# Patient Record
Sex: MALE | Race: WHITE | NOT HISPANIC OR LATINO
[De-identification: names, ages, dates, MRNs, and addresses within clinical notes are randomized per-mention and may not be internally consistent; named-entity substitution may affect disease eponyms.]

---

## 2022-05-18 PROBLEM — Z00.00 ENCOUNTER FOR PREVENTIVE HEALTH EXAMINATION: Status: ACTIVE | Noted: 2022-05-18

## 2022-05-23 ENCOUNTER — APPOINTMENT (OUTPATIENT)
Dept: NEUROSURGERY | Facility: CLINIC | Age: 57
End: 2022-05-23

## 2022-06-08 ENCOUNTER — NON-APPOINTMENT (OUTPATIENT)
Age: 57
End: 2022-06-08

## 2022-06-08 ENCOUNTER — APPOINTMENT (OUTPATIENT)
Dept: NEUROSURGERY | Facility: CLINIC | Age: 57
End: 2022-06-08
Payer: COMMERCIAL

## 2022-06-08 VITALS
WEIGHT: 175 LBS | TEMPERATURE: 98.7 F | HEIGHT: 67 IN | BODY MASS INDEX: 27.47 KG/M2 | SYSTOLIC BLOOD PRESSURE: 115 MMHG | HEART RATE: 137 BPM | OXYGEN SATURATION: 99 % | DIASTOLIC BLOOD PRESSURE: 74 MMHG

## 2022-06-08 DIAGNOSIS — Z01.818 ENCOUNTER FOR OTHER PREPROCEDURAL EXAMINATION: ICD-10-CM

## 2022-06-08 PROCEDURE — 99205 OFFICE O/P NEW HI 60 MIN: CPT

## 2022-06-09 NOTE — ASSESSMENT
[FreeTextEntry1] : I have discussed the natural history and treatment options for cervical myelopathy with the patient. I explained the indications for observation, conservative management, medical management, physical therapy, pain management approaches and surgery. I explained the different types and surgical approaches including anterior and posterior approaches as well as decompression only procedures and instrumented fusions. I discussed the risks, benefits, possible complications and expected outcome related to each treatment option. The risks of surgery were discussed in detail including but not limited to postoperative infection at the surgical site, hospital acquired pneumonia, hospital acquired urinary tract infection, postoperative meningitis, wound dehiscence, CSF leak, stroke (ischemic and hemorrhagic), postoperative seizures, worsening motor function due to spinal cord or nerve injury, postoperative visual deficit which could be permanent (blindness) when surgery is performed in a prone position, cardiovascular complications (MI, PE, DVT) and I also explained that some of these complications could lead to sepsis, coma or even death. I discussed the fact that some of these complications may require subsequent surgical procedure(s) to correct them. \par In the end, I recommend completing the work up with F/E xrays and CT scan cervical spine and follow up in 2 weeks so we can discuss the results and surgical options.\par The patient understands the plan of care and is in agreement.  All questions answered to patient satisfaction.

## 2022-06-09 NOTE — END OF VISIT
[FreeTextEntry3] : I have seen the patient and reviewed the case together with PA and I agree with the final recommendations and plan of care.\par \par Perry Montes MD\par Neurosurgery\par \par  [Time Spent: ___ minutes] : I have spent [unfilled] minutes of time on the encounter. [>50% of the face to face encounter time was spent on counseling and/or coordination of care for ___] : Greater than 50% of the face to face encounter time was spent on counseling and/or coordination of care for [unfilled]

## 2022-06-09 NOTE — HISTORY OF PRESENT ILLNESS
[de-identified] : ROYER FLORES is a 56 year male with a PMH of  HTN, pseudogout/arthritis/ACL tear of left knee, who presents to the office today at the request of his neurologist Dr. Paulina Tabares for neurosurgical consultation due to cervical myelopathy. Symptoms of numbness and tingling started 6 years ago, initially right arm and hand, then right leg and foot, then left side arm and legs. Had similar symptoms 15 years ago treated with cervical traction, PT and resolved. He now also has weakness of the lower extremities resulting in significant difficulty with ambulation. He can walk very short distances with a walker. He states he is house bound. He had to retire early d/t these symptoms in December 2020. He is also having difficulty with writing, typing, holding items. These symptoms are severely affecting his quality of life. The patient has undergone imaging in the form of MRI cervical spine at [facility] on 4/7/22 which revealed cervical spondylosis notable for concentric canal narrowing at C6-7 and C7-T1 (see detailed report scanned in EMR).\par PMH: per HPI\par PSH:left knee arthroscopy age 25, tonsillectomy as age 21, wisdom teeth extraction age 22\par FamHx: mother, father, brother HTN \par Social Hx: retired school psychologist, single, lives alone, non-smoker, occasional Etoh\par Allergies: codeine, opioids - vomiting \par Medications: amlodipine, Bystolic, valsartan \par  \par PCP Dr. Hart\par \par

## 2022-06-10 ENCOUNTER — RESULT REVIEW (OUTPATIENT)
Age: 57
End: 2022-06-10

## 2022-08-08 ENCOUNTER — APPOINTMENT (OUTPATIENT)
Dept: NEUROSURGERY | Facility: CLINIC | Age: 57
End: 2022-08-08

## 2022-08-08 VITALS
WEIGHT: 132 LBS | BODY MASS INDEX: 20.72 KG/M2 | OXYGEN SATURATION: 97 % | HEIGHT: 67 IN | SYSTOLIC BLOOD PRESSURE: 143 MMHG | HEART RATE: 132 BPM | DIASTOLIC BLOOD PRESSURE: 100 MMHG | TEMPERATURE: 98.7 F

## 2022-08-08 PROCEDURE — 99215 OFFICE O/P EST HI 40 MIN: CPT

## 2022-08-09 NOTE — ASSESSMENT
[FreeTextEntry1] : I have discussed the natural history and treatment options for cervical myelopathy with the patient. I explained the indications for observation, conservative management, medical management, physical therapy, pain management approaches and surgery. I explained the different types and surgical approaches including anterior and posterior approaches as well as decompression only procedures and instrumented fusions. I discussed the risks, benefits, possible complications and expected outcome related to each treatment option. The risks of surgery were discussed in detail including but not limited to postoperative infection at the surgical site, hospital acquired pneumonia, hospital acquired urinary tract infection, postoperative meningitis, wound dehiscence, CSF leak, stroke (ischemic and hemorrhagic), postoperative seizures, worsening motor function due to spinal cord or nerve injury, postoperative visual deficit which could be permanent (blindness) when surgery is performed in a prone position, cardiovascular complications (MI, PE, DVT) and I also explained that some of these complications could lead to sepsis, coma or even death. I discussed the fact that some of these complications may require subsequent surgical procedure(s) to correct them. \par In the end, I recommend surgical decompression with two level anterior cervical discectomy and fusion at C6-C7 and C7-T1. Chlorhexidine wipes and pre-surgical testing orders were given to him. He will get pre-op clearance with his PCP and his cardiologist. \par The patient understands the plan of care and is in agreement.  All questions answered to patient satisfaction.

## 2022-08-09 NOTE — HISTORY OF PRESENT ILLNESS
[FreeTextEntry1] : Mr. Chance returns to the office today for interval follow up and imaging review for cervical myelopathy. As recommended he has undergone CT and flex/ex X-rays of the cervical spine at [facility] on 7/1/22 and brought in CD for review (reports not available). No interval changes to medical history. His symptoms have worsened. He is now unable to ambulate and confined to wheelchair. He has also developed urinary retention and incontinence and severe constipation. \par \par 6/8/22: ROYER CHANCE is a 56 year male with a PMH of HTN, pseudogout/arthritis/ACL tear of left knee, who presents to the office today at the request of his neurologist Dr. Paulina Tabares for neurosurgical consultation due to cervical myelopathy. Symptoms of numbness and tingling started 6 years ago, initially right arm and hand, then right leg and foot, then left side arm and legs. Had similar symptoms 15 years ago treated with cervical traction, PT and resolved. He now also has weakness of the lower extremities resulting in significant difficulty with ambulation. He can walk very short distances with a walker. He states he is house bound. He had to retire early d/t these symptoms in December 2020. He is also having difficulty with writing, typing, holding items. These symptoms are severely affecting his quality of life. The patient has undergone imaging in the form of MRI cervical spine at [facility] on 4/7/22 which revealed cervical spondylosis notable for concentric canal narrowing at C6-7 and C7-T1 (see detailed report scanned in EMR).\par PMH: per HPI\par PSH:left knee arthroscopy age 25, tonsillectomy as age 21, wisdom teeth extraction age 22\par FamHx: mother, father, brother HTN \par Social Hx: retired school psychologist, single, lives alone, non-smoker, occasional Etoh\par Allergies: codeine, opioids - vomiting \par Medications: amlodipine, Bystolic, valsartan \par  \par PCP Dr. Hart\par

## 2022-08-09 NOTE — PHYSICAL EXAM
[FreeTextEntry1] : Constitutional: alert and in no acute distress. \par Psychiatric: oriented to person, place, and time, insight and judgment were intact and the affect was normal. \par \par Orientation: oriented to person, oriented to place and oriented to time. \par Memory: short term memory intact and remote memory intact. \par Attention: the attention span was normal and normal concentrating ability. \par Language: fluency intact and comprehension intact. \par Fund of knowledge: displays adequate knowledge of current events, adequate knowledge of personal past history and adequate range of vocabulary. \par Cranial Nerves: visual acuity intact bilaterally, pupils equal round and reactive to light, extraocular motion intact, facial sensation intact symmetrically, face symmetrical, hearing was intact bilaterally, tongue and palate midline, head turning and shoulder shrug symmetric and there was no tongue deviation with protrusion. \par Sensory exam: light touch was intact. \par non-ambulatory\par Deep tendon reflexes: \par Biceps right 3+. Biceps left 3+.  \par Triceps right 3+. Triceps left 3+.  \par Brachioradialis right 3+. Brachioradialis left 3+.  \par Patella right 3+. Patella left 3+.  \par Ankle jerk right 3+. Ankle jerk left 3+.  \par

## 2022-09-01 ENCOUNTER — APPOINTMENT (OUTPATIENT)
Dept: NEUROSURGERY | Facility: HOSPITAL | Age: 57
End: 2022-09-01

## 2022-09-28 ENCOUNTER — APPOINTMENT (OUTPATIENT)
Dept: NEUROSURGERY | Facility: CLINIC | Age: 57
End: 2022-09-28

## 2022-09-28 ENCOUNTER — NON-APPOINTMENT (OUTPATIENT)
Age: 57
End: 2022-09-28

## 2022-09-28 VITALS
WEIGHT: 175 LBS | DIASTOLIC BLOOD PRESSURE: 83 MMHG | OXYGEN SATURATION: 98 % | HEIGHT: 67 IN | SYSTOLIC BLOOD PRESSURE: 127 MMHG | BODY MASS INDEX: 27.47 KG/M2 | HEART RATE: 75 BPM | TEMPERATURE: 98.7 F

## 2022-09-28 DIAGNOSIS — G95.9 DISEASE OF SPINAL CORD, UNSPECIFIED: ICD-10-CM

## 2022-09-28 PROCEDURE — 99215 OFFICE O/P EST HI 40 MIN: CPT

## 2022-09-28 NOTE — ASSESSMENT
[FreeTextEntry1] : I have discussed the natural history and treatment options for cervical myelopathy with the patient. I explained the indications for observation, conservative management, medical management, physical therapy, pain management approaches and surgery. I explained the different types and surgical approaches including anterior and posterior approaches as well as decompression only procedures and instrumented fusions. I discussed the risks, benefits, possible complications and expected outcome related to each treatment option. The risks of surgery were discussed in detail including but not limited to postoperative infection at the surgical site, hospital acquired pneumonia, hospital acquired urinary tract infection, postoperative meningitis, wound dehiscence, CSF leak, stroke (ischemic and hemorrhagic), postoperative seizures, worsening motor function due to spinal cord or nerve injury, postoperative visual deficit which could be permanent (blindness) when surgery is performed in a prone position, cardiovascular complications (MI, PE, DVT) and I also explained that some of these complications could lead to sepsis, coma or even death. I discussed the fact that some of these complications may require subsequent surgical procedure(s) to correct them. \par In the end, I recommend postponing surgical decompression with two level anterior cervical discectomy and fusion at C6-C7 and C7-T1 at this time due to improving neurological status with Vitamin B12 supplementation.  He should continue his B12 repletion, regimen per Neurology Dr. Tabares and PCP Dr. Hart and he should follow up with us in the office in 3 months time with MRI Cspine prior to visit.  He was advised to participate in outpatient PT, but he declined at this timee, stating that he does not qualify.  \par The patient understands the plan of care and is in agreement. All questions answered to patient satisfaction. \par

## 2022-09-28 NOTE — PHYSICAL EXAM
[FreeTextEntry1] : \par Constitutional: alert and in no acute distress. \par Psychiatric: oriented to person, place, and time, insight and judgment were intact and the affect was normal. \par \par Orientation: oriented to person, oriented to place and oriented to time. \par Memory: short term memory intact and remote memory intact. \par Attention: the attention span was normal and normal concentrating ability. \par Language: fluency intact and comprehension intact. \par Fund of knowledge: displays adequate knowledge of current events, adequate knowledge of personal past history and adequate range of vocabulary. \par Cranial Nerves: visual acuity intact bilaterally, pupils equal round and reactive to light, extraocular motion intact, facial sensation intact symmetrically, face symmetrical, hearing was intact bilaterally, tongue and palate midline, head turning and shoulder shrug symmetric and there was no tongue deviation with protrusion. \par Sensory exam: light touch was intact. \par non-ambulatory\par Motor: 5/5 bilat UE and LE except 5-/5 right hand \par Deep tendon reflexes: \par Biceps right 3+. Biceps left 3+. \par Triceps right 3+. Triceps left 3+. \par Brachioradialis right 3+. Brachioradialis left 3+. on left, 2+ on right \par Patella right 3+. Patella left 3+. \par Ankle jerk right 3+. Ankle jerk left 3+. \par no clonus Rt lower leg

## 2022-09-28 NOTE — REASON FOR VISIT
[FreeTextEntry1] : Mr. Chance returns today for surgical discussion for his known cervical myelopathy.  He has received B12 supplementation (second injection yesterday) and reports that he has seen improved mobility with transfers into and out of his wheelchair and feeling lower extremity improved strength.  \par \par 8/8/22: Mr. Chance returns to the office today for interval follow up and imaging review for cervical myelopathy. As recommended he has undergone CT and flex/ex X-rays of the cervical spine at [facility] on 7/1/22 and brought in CD for review (reports not available). No interval changes to medical history. His symptoms have worsened. He is now unable to ambulate and confined to wheelchair. He has also developed urinary retention and incontinence and severe constipation. \par \par 6/8/22: ROYER CHANCE is a 56 year right handed male with a PMH of HTN, pseudogout/arthritis/ACL tear of left knee, who presents to the office today at the request of his neurologist Dr. Paulina Tabares for neurosurgical consultation due to cervical myelopathy. Symptoms of numbness and tingling started 6 years ago, initially right arm and hand, then right leg and foot, then left side arm and legs. Had similar symptoms 15 years ago treated with cervical traction, PT and resolved. He now also has weakness of the lower extremities resulting in significant difficulty with ambulation. He can walk very short distances with a walker. He states he is house bound. He had to retire early d/t these symptoms in December 2020. He is also having difficulty with writing, typing, holding items. These symptoms are severely affecting his quality of life. The patient has undergone imaging in the form of MRI cervical spine at [facility] on 4/7/22 which revealed cervical spondylosis notable for concentric canal narrowing at C6-7 and C7-T1 (see detailed report scanned in EMR).\par PSH:left knee arthroscopy age 25, tonsillectomy as age 21, wisdom teeth extraction age 22\par FamHx: mother, father, brother HTN \par Social Hx: retired school psychologist, single, lives alone, non-smoker, occasional Etoh\par Allergies: codeine, opioids - vomiting \par Medications: amlodipine, Bystolic, valsartan \par  \par PCP Dr. Hart

## 2023-05-08 ENCOUNTER — APPOINTMENT (OUTPATIENT)
Dept: NEUROSURGERY | Facility: CLINIC | Age: 58
End: 2023-05-08

## 2023-05-31 ENCOUNTER — APPOINTMENT (OUTPATIENT)
Dept: NEUROSURGERY | Facility: CLINIC | Age: 58
End: 2023-05-31
Payer: COMMERCIAL

## 2023-06-05 ENCOUNTER — APPOINTMENT (OUTPATIENT)
Dept: NEUROSURGERY | Facility: CLINIC | Age: 58
End: 2023-06-05
Payer: COMMERCIAL

## 2023-06-05 VITALS
DIASTOLIC BLOOD PRESSURE: 94 MMHG | OXYGEN SATURATION: 98 % | SYSTOLIC BLOOD PRESSURE: 139 MMHG | BODY MASS INDEX: 27.47 KG/M2 | HEIGHT: 67 IN | HEART RATE: 114 BPM | WEIGHT: 175 LBS

## 2023-06-05 PROCEDURE — 99215 OFFICE O/P EST HI 40 MIN: CPT

## 2023-06-05 NOTE — ASSESSMENT
[FreeTextEntry1] : I have discussed the natural history and treatment options for cervical myelopathy with the patient. I explained the indications for observation, conservative management, medical management, physical therapy, pain management approaches and surgery. I explained the different types and surgical approaches including anterior and posterior approaches as well as decompression only procedures and instrumented fusions. I discussed the risks, benefits, possible complications and expected outcome related to each treatment option. \par \par In the end, I recommend continued Vitamin B12 supplementation as needed. He should continue his B12 repletion, regimen per Neurology Dr. Tabares and PCP Dr. Hart and he should follow up with us in the office as needed at this point. He was advised to participate in outpatient PT as able. \par \par The patient understands the plan of care and is in agreement. All questions answered to patient satisfaction.

## 2023-06-05 NOTE — HISTORY OF PRESENT ILLNESS
[FreeTextEntry1] : Mr. Chance is a right handed man presents today for neurosurgical follow up.  Previous not and interval history reviewed. Reports continued significant improvement in previously mentioned symptoms, specifically mobility, while undergoing B12 supplementation. Last injection months ago. Right ulnar tingling in right hand and foot. Endorses electric pain in fingers and toes. Not in PT due to difficulty walking and transferring. Has wheelchair at home. Reports no new neurologic symptoms.  Presents with updated imaging for review, detailed below . . \par \par 9/28/22: Mr. Chance returns today for surgical discussion for his known cervical myelopathy. He has received B12 supplementation (second injection yesterday) and reports that he has seen improved mobility with transfers into and out of his wheelchair and feeling lower extremity improved strength. \par \par 8/8/22: Mr. Chance returns to the office today for interval follow up and imaging review for cervical myelopathy. As recommended he has undergone CT and flex/ex X-rays of the cervical spine at [facility] on 7/1/22 and brought in CD for review (reports not available). No interval changes to medical history. His symptoms have worsened. He is now unable to ambulate and confined to wheelchair. He has also developed urinary retention and incontinence and severe constipation. \par \par 6/8/22: ROYER CHANCE is a 56 year right handed male with a PMH of HTN, pseudogout/arthritis/ACL tear of left knee, who presents to the office today at the request of his neurologist Dr. Paulina Tabares for neurosurgical consultation due to cervical myelopathy. Symptoms of numbness and tingling started 6 years ago, initially right arm and hand, then right leg and foot, then left side arm and legs. Had similar symptoms 15 years ago treated with cervical traction, PT and resolved. He now also has weakness of the lower extremities resulting in significant difficulty with ambulation. He can walk very short distances with a walker. He states he is house bound. He had to retire early d/t these symptoms in December 2020. He is also having difficulty with writing, typing, holding items. These symptoms are severely affecting his quality of life. The patient has undergone imaging in the form of MRI cervical spine at [facility] on 4/7/22 which revealed cervical spondylosis notable for concentric canal narrowing at C6-7 and C7-T1 (see detailed report scanned in EMR).\par PSH:left knee arthroscopy age 25, tonsillectomy as age 21, wisdom teeth extraction age 22\par FamHx: mother, father, brother HTN \par Social Hx: retired school psychologist, single, lives alone, non-smoker, occasional Etoh\par Allergies: codeine, opioids - vomiting \par Medications: amlodipine, Bystolic, valsartan \par  \par PCP Dr. Hart \par

## 2023-06-05 NOTE — DATA REVIEWED
[de-identified] : MRI 5/2/23 C spine:  stable multilevel cervical spondylosis, most severe at C4-5, C5-6 and C6-7. No evidence of cord signal abnormality

## 2023-06-05 NOTE — PHYSICAL EXAM
[FreeTextEntry1] : Constitutional: alert and in no acute distress. \par Psychiatric: oriented to person, place, and time, insight and judgment were intact and the affect was normal. \par \par Orientation: oriented to person, oriented to place and oriented to time. \par Memory: short term memory intact and remote memory intact. \par Attention: the attention span was normal and normal concentrating ability. \par Language: fluency intact and comprehension intact. \par Fund of knowledge: displays adequate knowledge of current events, adequate knowledge of personal past history and adequate range of vocabulary. \par Cranial Nerves: visual acuity intact bilaterally, pupils equal round and reactive to light, extraocular motion intact, facial sensation intact symmetrically, face symmetrical, hearing was intact bilaterally, tongue and palate midline, head turning and shoulder shrug symmetric and there was no tongue deviation with protrusion. \par Sensory exam: light touch was intact. \par non-ambulatory\par Motor: 5/5 bilat UE and LE except 5-/5 right hand \par Deep tendon reflexes: \par Biceps right 3+. Biceps left 3+. \par Triceps right 3+. Triceps left 3+. \par Brachioradialis right 3+. Brachioradialis left 3+. on left, 2+ on right \par Patella right 3+. Patella left 3+. \par Ankle jerk right 3+. Ankle jerk left 3+. \par + few beats clonus bilat. \par